# Patient Record
Sex: FEMALE
[De-identification: names, ages, dates, MRNs, and addresses within clinical notes are randomized per-mention and may not be internally consistent; named-entity substitution may affect disease eponyms.]

---

## 2019-05-22 PROBLEM — Z00.00 ENCOUNTER FOR PREVENTIVE HEALTH EXAMINATION: Status: ACTIVE | Noted: 2019-05-22

## 2019-06-05 ENCOUNTER — APPOINTMENT (OUTPATIENT)
Dept: BARIATRICS | Facility: CLINIC | Age: 29
End: 2019-06-05

## 2020-01-08 ENCOUNTER — APPOINTMENT (OUTPATIENT)
Dept: BARIATRICS | Facility: CLINIC | Age: 30
End: 2020-01-08

## 2020-04-08 ENCOUNTER — APPOINTMENT (OUTPATIENT)
Dept: BARIATRICS | Facility: CLINIC | Age: 30
End: 2020-04-08
Payer: MEDICAID

## 2020-04-08 VITALS — WEIGHT: 265 LBS | BODY MASS INDEX: 44.15 KG/M2 | HEIGHT: 65 IN

## 2020-04-08 PROCEDURE — 99202 OFFICE O/P NEW SF 15 MIN: CPT

## 2020-04-20 ENCOUNTER — APPOINTMENT (OUTPATIENT)
Dept: BARIATRICS | Facility: CLINIC | Age: 30
End: 2020-04-20

## 2020-05-01 NOTE — HISTORY OF PRESENT ILLNESS
[de-identified] : Patient is a 29 year old female with along history of morbid obesity not responsive to multiple dietary regimens. She has a BMI of 44.1 and weight-related comorbidities including obstructive sleep apnea.

## 2020-05-01 NOTE — PHYSICAL EXAM
[Normal] : well developed, well nourished, in no acute distress [Obese, well nourished, in no acute distress] : obese, well nourished, in no acute distress

## 2020-05-11 ENCOUNTER — APPOINTMENT (OUTPATIENT)
Dept: BARIATRICS | Facility: CLINIC | Age: 30
End: 2020-05-11
Payer: MEDICAID

## 2020-05-11 VITALS — BODY MASS INDEX: 43.82 KG/M2 | WEIGHT: 263 LBS | HEIGHT: 65 IN

## 2020-05-11 PROCEDURE — 97802 MEDICAL NUTRITION INDIV IN: CPT | Mod: 95

## 2020-06-15 ENCOUNTER — APPOINTMENT (OUTPATIENT)
Dept: BARIATRICS | Facility: CLINIC | Age: 30
End: 2020-06-15
Payer: MEDICAID

## 2020-06-15 VITALS — BODY MASS INDEX: 43.82 KG/M2 | HEIGHT: 65 IN | WEIGHT: 263 LBS

## 2020-06-15 DIAGNOSIS — E66.01 MORBID (SEVERE) OBESITY DUE TO EXCESS CALORIES: ICD-10-CM

## 2020-06-15 DIAGNOSIS — G47.33 OBSTRUCTIVE SLEEP APNEA (ADULT) (PEDIATRIC): ICD-10-CM

## 2020-06-15 PROCEDURE — 97802 MEDICAL NUTRITION INDIV IN: CPT | Mod: 95

## 2020-07-13 ENCOUNTER — APPOINTMENT (OUTPATIENT)
Dept: BARIATRICS | Facility: CLINIC | Age: 30
End: 2020-07-13

## 2020-07-30 PROBLEM — G47.33 OBSTRUCTIVE SLEEP APNEA: Status: ACTIVE | Noted: 2020-04-08

## 2020-07-30 PROBLEM — E66.01 MORBID OBESITY DUE TO EXCESS CALORIES: Status: ACTIVE | Noted: 2020-04-08
